# Patient Record
Sex: MALE | NOT HISPANIC OR LATINO | URBAN - METROPOLITAN AREA
[De-identification: names, ages, dates, MRNs, and addresses within clinical notes are randomized per-mention and may not be internally consistent; named-entity substitution may affect disease eponyms.]

---

## 2018-08-17 ENCOUNTER — IMPORTED ENCOUNTER (OUTPATIENT)
Dept: URBAN - METROPOLITAN AREA CLINIC 1 | Facility: CLINIC | Age: 58
End: 2018-08-17

## 2018-08-17 PROBLEM — H16.143: Noted: 2018-08-17

## 2018-08-17 PROBLEM — H25.813: Noted: 2018-08-17

## 2018-08-17 PROBLEM — H04.123: Noted: 2018-08-17

## 2018-08-17 PROBLEM — H43.812: Noted: 2018-08-17

## 2018-08-17 PROCEDURE — 92014 COMPRE OPH EXAM EST PT 1/>: CPT

## 2018-08-17 NOTE — PATIENT DISCUSSION
1.  PVD w/o Tear OS - Patient was cautioned to call our office immediately if they experience a substantial change in their symptoms such as an increase in floaters persistent flashes loss of visual field (may appear as a shadow or a curtain) or decrease in visual acuity as these may indicate a retinal tear or detachment. 2.  KONSTANTIN w/ PEK OU- Recommend ATs BID OU routinely 3. Cataract OU: Observe for now without intervention. The patient was advised to contact us if any change or worsening of vision4. H/o Chronic Uveitis OU controlled. () Lyme Titer. (-) HLA-B27. Quiet. 5. OHTN OU (CD 0.40/0.30) - IOP 13 OU. TMAX 24/13. Return for an appointment in 6 weeks 10/DFE OS only with Dr. Tarik Contreras.

## 2018-10-05 ENCOUNTER — IMPORTED ENCOUNTER (OUTPATIENT)
Dept: URBAN - METROPOLITAN AREA CLINIC 1 | Facility: CLINIC | Age: 58
End: 2018-10-05

## 2018-10-05 PROBLEM — H43.812: Noted: 2018-10-05

## 2018-10-05 PROCEDURE — 99213 OFFICE O/P EST LOW 20 MIN: CPT

## 2018-10-05 NOTE — PATIENT DISCUSSION
KONSTANTIN w/ PEK OU- Recommend ATs BID OU routinely 3. Cataract OU: Observe for now without intervention. The patient was advised to contact us if any change or worsening of vision4. H/o Chronic Uveitis OU controlled. () Lyme Titer. (-) HLA-B27. Quiet. 5. OHTN OU (CD 0.40/0.30) - IOP 13 OU. TMAX 24/13.

## 2018-10-05 NOTE — PATIENT DISCUSSION
1.  PVD w/o Tear OS- stable RD precautions. 2.  KONSTANTIN w/ PEK OU- Recommend ATs BID OU routinely 3. Cataract OU: Observe 4. OHTN OU (CD 0.40/0.30) - TMAX 24/13. 5.  H/o Chronic Uveitis OU controlled. () Lyme Titer. (-) HLA-B27. Quiet. Return for an appointment in August 30 with Dr. Shanice Spring.

## 2019-08-05 ENCOUNTER — IMPORTED ENCOUNTER (OUTPATIENT)
Dept: URBAN - METROPOLITAN AREA CLINIC 1 | Facility: CLINIC | Age: 59
End: 2019-08-05

## 2019-08-05 PROBLEM — H16.143: Noted: 2019-08-05

## 2019-08-05 PROBLEM — H43.812: Noted: 2019-08-05

## 2019-08-05 PROBLEM — H02.834: Noted: 2019-08-05

## 2019-08-05 PROBLEM — H02.831: Noted: 2019-08-05

## 2019-08-05 PROBLEM — H40.053: Noted: 2019-08-05

## 2019-08-05 PROBLEM — H04.123: Noted: 2019-08-05

## 2019-08-05 PROBLEM — H25.813: Noted: 2019-08-05

## 2019-08-05 PROCEDURE — 92014 COMPRE OPH EXAM EST PT 1/>: CPT

## 2019-08-05 NOTE — PATIENT DISCUSSION
1.  Cataract OU -- Observe for now without intervention. The patient was advised to contact us if any change or worsening of vision2. KONSTANTIN w/ PEK OU -- Recommend ATs BID OU routinely. 3.  PVD w/o Tear OS -- Old Stable. RD precautions. 4.  OHTN -- (0.400.30) IOP 15 OU today. T-Max 24/13. Observe for changes/progression. 5.  H/o Chronic Uveitis OU -- controlled. () Lyme Titer. (-) HLA-B27. Quiet today. 6. Dermatochalasis OU UL's  -- Follow with no intervention at this time. Patient defers Mrx today. Return for an appointment in 1 year for a 30 with Dr. Robi Vazquez.

## 2022-04-02 ASSESSMENT — VISUAL ACUITY
OD_CC: 20/20
OS_CC: 20/20
OD_CC: 20/20
OD_CC: J2
OD_CC: 20/25
OS_CC: 20/20-2
OS_CC: J2
OS_CC: 20/20

## 2022-04-02 ASSESSMENT — TONOMETRY
OD_IOP_MMHG: 13
OD_IOP_MMHG: 15
OD_IOP_MMHG: 15
OS_IOP_MMHG: 15
OS_IOP_MMHG: 15
OS_IOP_MMHG: 13

## 2024-10-28 ENCOUNTER — NEW PATIENT (OUTPATIENT)
Dept: URBAN - METROPOLITAN AREA CLINIC 1 | Facility: CLINIC | Age: 64
End: 2024-10-28

## 2024-10-28 DIAGNOSIS — H04.123: ICD-10-CM

## 2024-10-28 DIAGNOSIS — H02.834: ICD-10-CM

## 2024-10-28 DIAGNOSIS — H43.812: ICD-10-CM

## 2024-10-28 DIAGNOSIS — H40.053: ICD-10-CM

## 2024-10-28 DIAGNOSIS — H16.143: ICD-10-CM

## 2024-10-28 DIAGNOSIS — H25.813: ICD-10-CM

## 2024-10-28 DIAGNOSIS — H02.831: ICD-10-CM

## 2024-10-28 PROCEDURE — 99204 OFFICE O/P NEW MOD 45 MIN: CPT
